# Patient Record
Sex: FEMALE | Race: WHITE | NOT HISPANIC OR LATINO | Employment: OTHER
[De-identification: names, ages, dates, MRNs, and addresses within clinical notes are randomized per-mention and may not be internally consistent; named-entity substitution may affect disease eponyms.]

---

## 2018-04-03 ENCOUNTER — TRANSCRIBE ORDERS (OUTPATIENT)
Dept: SCHEDULING | Age: 63
End: 2018-04-03

## 2018-04-03 DIAGNOSIS — Z12.39 SCREENING BREAST EXAMINATION: Primary | ICD-10-CM

## 2018-04-23 ENCOUNTER — HOSPITAL ENCOUNTER (OUTPATIENT)
Dept: RADIOLOGY | Age: 63
Discharge: HOME | End: 2018-04-23
Attending: INTERNAL MEDICINE
Payer: COMMERCIAL

## 2018-04-23 DIAGNOSIS — Z12.39 SCREENING BREAST EXAMINATION: ICD-10-CM

## 2018-04-23 PROCEDURE — 77063 BREAST TOMOSYNTHESIS BI: CPT

## 2021-04-14 DIAGNOSIS — Z23 ENCOUNTER FOR IMMUNIZATION: ICD-10-CM

## 2023-11-29 ENCOUNTER — APPOINTMENT (OUTPATIENT)
Dept: PREADMISSION TESTING | Facility: HOSPITAL | Age: 68
End: 2023-11-29
Payer: MEDICARE

## 2023-11-29 VITALS — BODY MASS INDEX: 26.68 KG/M2 | HEIGHT: 67 IN | WEIGHT: 170 LBS

## 2023-11-29 RX ORDER — OXYBUTYNIN CHLORIDE 5 MG/1
TABLET ORAL
COMMUNITY
Start: 2023-10-18

## 2023-11-29 RX ORDER — ELETRIPTAN HYDROBROMIDE 40 MG/1
TABLET, FILM COATED ORAL
COMMUNITY
Start: 2023-10-30

## 2023-11-29 NOTE — PRE-PROCEDURE INSTRUCTIONS
1. We will call you between 3 pm and 7 pm on 12/11/23 to inform you of arrival time for your procedure. If you do not hear by 6PM, please call [phone] for arrival time.     2. Please arrive through the Main Entrance of the [facility] (across from the parking garage) and report to the Surgery Registration Desk on the day of your procedure.    3. Please follow the following fasting guidelines:     No solid food EIGHT HOURS prior to arrival time on day of surgery.   clear liquids, meaning water or PLAIN black coffee WITHOUT any milk, cream, sugar, or sweetener are permitted up to TWO HOURS prior to arrival at the hospital.    4. Please take ONLY the following medications with a sip of water on the morning of your procedure: (populate names and/or NONE)    None    5. Other Instructions: You may brush your teeth the morning of the procedure. Rinse and spit, do not swallow.  Bring a list of your medications with dosages.  Use surgical wash as directed.     6. If you develop a cold, cough, fever, rash, or other symptom prior to the day of the procedure, please report it to your physician immediately.    7. If you need to cancel the procedure for any reason, please contact your physician.    8. Make arrangements to have safe transportation home accompanied by a responsible adult. If you have not arranged safe transportation home, your surgery will be cancelled. Safe transportation may include private vehicle, ride-share service, taxi and public transportation when accompanied by a responsible adult who will assist you home. A responsible adult is someone known to you and does not include the taxi, ride-share or public transit drive transporting you.    9.  If it is medically necessary for you to have a longer stay, you will be informed as soon as the decision is made.    10. Only bring essential items to the hospital.  Do not wear or bring anything of value to the hospital including jewelry of any kind, money, or wallet.  Do not wear make-up or contact lenses.  DO NOT BRING MEDICATIONS FROM HOME unless instructed to do so. DO bring your hearing aids, glasses, and a case    11. No lotion, creams, powders, or oils on skin the morning of procedure     12. Dress in comfortable clothes.    13.  If instructed, please bring a copy of your Advanced Directive (Living Will/Durable Power of ) on the day of your procedure.     14. Ensuring your safety at all times is a very important part of our [facility] Culture of Safety. After having surgery and sedation, you are at risk for falling and balance issues. Although you may feel awake, the effects of the medication can last up to 24 hours after anesthesia. If you need to use the bathroom during your recovery period, nursing staff will escort you there and stay with you to ensure your safety.    15. Refrain from drinking alcohol and smoking cigarettes for 24 hours prior to surgery.    16. Shower with antibacterial soap (Dial) the night before and morning of your procedure.  If your procedure indicates the need for CHG antiseptic wash (Bactoshield or Hibiclens), please use this instead and follow instructions as discussed at the time of your Pre-Admission Testing phone interview or visit.    Above instructions reviewed with patient and patient acknowledges understanding.    Form explained by: Ju Wood RN

## 2023-12-07 ENCOUNTER — ANESTHESIA EVENT (OUTPATIENT)
Dept: OPERATING ROOM | Facility: HOSPITAL | Age: 68
Setting detail: HOSPITAL OUTPATIENT SURGERY
End: 2023-12-07
Payer: MEDICARE

## 2023-12-11 RX ORDER — SCOPOLAMINE 1 MG/3D
1 PATCH, EXTENDED RELEASE TRANSDERMAL ONCE
Status: DISCONTINUED | OUTPATIENT
Start: 2023-12-12 | End: 2023-12-13 | Stop reason: HOSPADM

## 2023-12-12 ENCOUNTER — HOSPITAL ENCOUNTER (OUTPATIENT)
Facility: HOSPITAL | Age: 68
Setting detail: HOSPITAL OUTPATIENT SURGERY
Discharge: HOME | End: 2023-12-12
Attending: STUDENT IN AN ORGANIZED HEALTH CARE EDUCATION/TRAINING PROGRAM | Admitting: STUDENT IN AN ORGANIZED HEALTH CARE EDUCATION/TRAINING PROGRAM
Payer: MEDICARE

## 2023-12-12 ENCOUNTER — ANESTHESIA (OUTPATIENT)
Dept: OPERATING ROOM | Facility: HOSPITAL | Age: 68
Setting detail: HOSPITAL OUTPATIENT SURGERY
End: 2023-12-12
Payer: MEDICARE

## 2023-12-12 VITALS
HEART RATE: 88 BPM | OXYGEN SATURATION: 93 % | BODY MASS INDEX: 27.8 KG/M2 | HEIGHT: 66 IN | DIASTOLIC BLOOD PRESSURE: 65 MMHG | RESPIRATION RATE: 18 BRPM | WEIGHT: 173 LBS | TEMPERATURE: 98 F | SYSTOLIC BLOOD PRESSURE: 119 MMHG

## 2023-12-12 DIAGNOSIS — M54.9 DORSALGIA: ICD-10-CM

## 2023-12-12 DIAGNOSIS — M54.2 CERVICALGIA: ICD-10-CM

## 2023-12-12 DIAGNOSIS — N62 MACROMASTIA: ICD-10-CM

## 2023-12-12 PROCEDURE — 71000012 HC PACU PHASE 2 EA ADDL MIN: Performed by: STUDENT IN AN ORGANIZED HEALTH CARE EDUCATION/TRAINING PROGRAM

## 2023-12-12 PROCEDURE — 63600000 HC DRUGS/DETAIL CODE: Mod: JZ | Performed by: STUDENT IN AN ORGANIZED HEALTH CARE EDUCATION/TRAINING PROGRAM

## 2023-12-12 PROCEDURE — 63600000 HC DRUGS/DETAIL CODE: Mod: JZ | Performed by: NURSE ANESTHETIST, CERTIFIED REGISTERED

## 2023-12-12 PROCEDURE — 71000002 HC PACU PHASE 2 INITIAL 30MIN: Performed by: STUDENT IN AN ORGANIZED HEALTH CARE EDUCATION/TRAINING PROGRAM

## 2023-12-12 PROCEDURE — 27200000 HC STERILE SUPPLY: Performed by: STUDENT IN AN ORGANIZED HEALTH CARE EDUCATION/TRAINING PROGRAM

## 2023-12-12 PROCEDURE — 71000011 HC PACU PHASE 1 EA ADDL MIN: Performed by: STUDENT IN AN ORGANIZED HEALTH CARE EDUCATION/TRAINING PROGRAM

## 2023-12-12 PROCEDURE — 0HUV37Z SUPPLEMENT BILATERAL BREAST WITH AUTOLOGOUS TISSUE SUBSTITUTE, PERCUTANEOUS APPROACH: ICD-10-PCS | Performed by: STUDENT IN AN ORGANIZED HEALTH CARE EDUCATION/TRAINING PROGRAM

## 2023-12-12 PROCEDURE — 36000003 HC OR LEVEL 3 INITIAL 30MIN: Performed by: STUDENT IN AN ORGANIZED HEALTH CARE EDUCATION/TRAINING PROGRAM

## 2023-12-12 PROCEDURE — 63700000 HC SELF-ADMINISTRABLE DRUG: Performed by: STUDENT IN AN ORGANIZED HEALTH CARE EDUCATION/TRAINING PROGRAM

## 2023-12-12 PROCEDURE — 25000000 HC PHARMACY GENERAL: Performed by: NURSE ANESTHETIST, CERTIFIED REGISTERED

## 2023-12-12 PROCEDURE — 37000001 HC ANESTHESIA GENERAL: Performed by: STUDENT IN AN ORGANIZED HEALTH CARE EDUCATION/TRAINING PROGRAM

## 2023-12-12 PROCEDURE — 71000001 HC PACU PHASE 1 INITIAL 30MIN: Performed by: STUDENT IN AN ORGANIZED HEALTH CARE EDUCATION/TRAINING PROGRAM

## 2023-12-12 PROCEDURE — 25800000 HC PHARMACY IV SOLUTIONS: Performed by: NURSE ANESTHETIST, CERTIFIED REGISTERED

## 2023-12-12 PROCEDURE — 36000013 HC OR LEVEL 3 EA ADDL MIN: Performed by: STUDENT IN AN ORGANIZED HEALTH CARE EDUCATION/TRAINING PROGRAM

## 2023-12-12 PROCEDURE — 0HBV3ZZ EXCISION OF BILATERAL BREAST, PERCUTANEOUS APPROACH: ICD-10-PCS | Performed by: STUDENT IN AN ORGANIZED HEALTH CARE EDUCATION/TRAINING PROGRAM

## 2023-12-12 PROCEDURE — 63600000 HC DRUGS/DETAIL CODE: Mod: JZ | Performed by: ANESTHESIOLOGY

## 2023-12-12 PROCEDURE — 88305 TISSUE EXAM BY PATHOLOGIST: CPT | Performed by: STUDENT IN AN ORGANIZED HEALTH CARE EDUCATION/TRAINING PROGRAM

## 2023-12-12 PROCEDURE — 63700000 HC SELF-ADMINISTRABLE DRUG: Performed by: ANESTHESIOLOGY

## 2023-12-12 RX ORDER — ACETAMINOPHEN 325 MG/1
975 TABLET ORAL ONCE
Status: COMPLETED | OUTPATIENT
Start: 2023-12-12 | End: 2023-12-12

## 2023-12-12 RX ORDER — SODIUM CHLORIDE, SODIUM LACTATE, POTASSIUM CHLORIDE, CALCIUM CHLORIDE 600; 310; 30; 20 MG/100ML; MG/100ML; MG/100ML; MG/100ML
INJECTION, SOLUTION INTRAVENOUS CONTINUOUS
Status: DISCONTINUED | OUTPATIENT
Start: 2023-12-12 | End: 2023-12-13 | Stop reason: HOSPADM

## 2023-12-12 RX ORDER — HYDROMORPHONE HYDROCHLORIDE 1 MG/ML
INJECTION, SOLUTION INTRAMUSCULAR; INTRAVENOUS; SUBCUTANEOUS AS NEEDED
Status: DISCONTINUED | OUTPATIENT
Start: 2023-12-12 | End: 2023-12-12 | Stop reason: SURG

## 2023-12-12 RX ORDER — ACETAMINOPHEN 325 MG/1
975 TABLET ORAL
Status: DISCONTINUED | OUTPATIENT
Start: 2023-12-12 | End: 2023-12-13 | Stop reason: HOSPADM

## 2023-12-12 RX ORDER — MIDAZOLAM HYDROCHLORIDE 2 MG/2ML
INJECTION, SOLUTION INTRAMUSCULAR; INTRAVENOUS AS NEEDED
Status: DISCONTINUED | OUTPATIENT
Start: 2023-12-12 | End: 2023-12-12 | Stop reason: SURG

## 2023-12-12 RX ORDER — GABAPENTIN 300 MG/1
300 CAPSULE ORAL 3 TIMES DAILY
Status: DISCONTINUED | OUTPATIENT
Start: 2023-12-12 | End: 2023-12-13 | Stop reason: HOSPADM

## 2023-12-12 RX ORDER — IBUPROFEN 200 MG
16-32 TABLET ORAL AS NEEDED
Status: DISCONTINUED | OUTPATIENT
Start: 2023-12-12 | End: 2023-12-13 | Stop reason: HOSPADM

## 2023-12-12 RX ORDER — OXYCODONE HYDROCHLORIDE 5 MG/1
10 TABLET ORAL EVERY 4 HOURS PRN
Status: DISCONTINUED | OUTPATIENT
Start: 2023-12-12 | End: 2023-12-13 | Stop reason: HOSPADM

## 2023-12-12 RX ORDER — PROPOFOL 10 MG/ML
INJECTION, EMULSION INTRAVENOUS AS NEEDED
Status: DISCONTINUED | OUTPATIENT
Start: 2023-12-12 | End: 2023-12-12 | Stop reason: SURG

## 2023-12-12 RX ORDER — ONDANSETRON HYDROCHLORIDE 2 MG/ML
INJECTION, SOLUTION INTRAVENOUS AS NEEDED
Status: DISCONTINUED | OUTPATIENT
Start: 2023-12-12 | End: 2023-12-12 | Stop reason: SURG

## 2023-12-12 RX ORDER — AMOXICILLIN 250 MG
1 CAPSULE ORAL 2 TIMES DAILY
Status: DISCONTINUED | OUTPATIENT
Start: 2023-12-12 | End: 2023-12-13 | Stop reason: HOSPADM

## 2023-12-12 RX ORDER — DIAZEPAM 5 MG/1
5 TABLET ORAL EVERY 8 HOURS PRN
Status: DISCONTINUED | OUTPATIENT
Start: 2023-12-12 | End: 2023-12-13 | Stop reason: HOSPADM

## 2023-12-12 RX ORDER — ONDANSETRON HYDROCHLORIDE 2 MG/ML
4 INJECTION, SOLUTION INTRAVENOUS EVERY 8 HOURS PRN
Status: DISCONTINUED | OUTPATIENT
Start: 2023-12-12 | End: 2023-12-13 | Stop reason: HOSPADM

## 2023-12-12 RX ORDER — HYDROMORPHONE HYDROCHLORIDE 1 MG/ML
0.5 INJECTION, SOLUTION INTRAMUSCULAR; INTRAVENOUS; SUBCUTANEOUS
Status: DISCONTINUED | OUTPATIENT
Start: 2023-12-12 | End: 2023-12-12 | Stop reason: HOSPADM

## 2023-12-12 RX ORDER — LIDOCAINE HYDROCHLORIDE 10 MG/ML
INJECTION, SOLUTION INFILTRATION; PERINEURAL AS NEEDED
Status: DISCONTINUED | OUTPATIENT
Start: 2023-12-12 | End: 2023-12-12 | Stop reason: SURG

## 2023-12-12 RX ORDER — SCOPOLAMINE 1 MG/3D
1 PATCH, EXTENDED RELEASE TRANSDERMAL
Status: DISCONTINUED | OUTPATIENT
Start: 2023-12-12 | End: 2023-12-12 | Stop reason: HOSPADM

## 2023-12-12 RX ORDER — EPHEDRINE SULFATE 50 MG/ML
INJECTION, SOLUTION INTRAVENOUS AS NEEDED
Status: DISCONTINUED | OUTPATIENT
Start: 2023-12-12 | End: 2023-12-12 | Stop reason: SURG

## 2023-12-12 RX ORDER — PHENYLEPHRINE HYDROCHLORIDE 10 MG/ML
INJECTION INTRAVENOUS AS NEEDED
Status: DISCONTINUED | OUTPATIENT
Start: 2023-12-12 | End: 2023-12-12 | Stop reason: SURG

## 2023-12-12 RX ORDER — SODIUM CHLORIDE, SODIUM GLUCONATE, SODIUM ACETATE, POTASSIUM CHLORIDE AND MAGNESIUM CHLORIDE 30; 37; 368; 526; 502 MG/100ML; MG/100ML; MG/100ML; MG/100ML; MG/100ML
INJECTION, SOLUTION INTRAVENOUS CONTINUOUS PRN
Status: DISCONTINUED | OUTPATIENT
Start: 2023-12-12 | End: 2023-12-12 | Stop reason: SURG

## 2023-12-12 RX ORDER — HYDROMORPHONE HYDROCHLORIDE 1 MG/ML
0.5 INJECTION, SOLUTION INTRAMUSCULAR; INTRAVENOUS; SUBCUTANEOUS EVERY 4 HOURS PRN
Status: DISCONTINUED | OUTPATIENT
Start: 2023-12-12 | End: 2023-12-13 | Stop reason: HOSPADM

## 2023-12-12 RX ORDER — DEXTROSE 50 % IN WATER (D50W) INTRAVENOUS SYRINGE
25 AS NEEDED
Status: DISCONTINUED | OUTPATIENT
Start: 2023-12-12 | End: 2023-12-13 | Stop reason: HOSPADM

## 2023-12-12 RX ORDER — GABAPENTIN 300 MG/1
300 CAPSULE ORAL ONCE
Status: COMPLETED | OUTPATIENT
Start: 2023-12-12 | End: 2023-12-12

## 2023-12-12 RX ORDER — DEXAMETHASONE SODIUM PHOSPHATE 4 MG/ML
INJECTION, SOLUTION INTRA-ARTICULAR; INTRALESIONAL; INTRAMUSCULAR; INTRAVENOUS; SOFT TISSUE AS NEEDED
Status: DISCONTINUED | OUTPATIENT
Start: 2023-12-12 | End: 2023-12-12 | Stop reason: SURG

## 2023-12-12 RX ORDER — FENTANYL CITRATE 50 UG/ML
INJECTION, SOLUTION INTRAMUSCULAR; INTRAVENOUS AS NEEDED
Status: DISCONTINUED | OUTPATIENT
Start: 2023-12-12 | End: 2023-12-12 | Stop reason: SURG

## 2023-12-12 RX ORDER — ONDANSETRON HYDROCHLORIDE 2 MG/ML
4 INJECTION, SOLUTION INTRAVENOUS
Status: DISCONTINUED | OUTPATIENT
Start: 2023-12-12 | End: 2023-12-12 | Stop reason: HOSPADM

## 2023-12-12 RX ORDER — FENTANYL CITRATE 50 UG/ML
50 INJECTION, SOLUTION INTRAMUSCULAR; INTRAVENOUS EVERY 5 MIN PRN
Status: DISCONTINUED | OUTPATIENT
Start: 2023-12-12 | End: 2023-12-12 | Stop reason: HOSPADM

## 2023-12-12 RX ORDER — DEXTROSE 40 %
15-30 GEL (GRAM) ORAL AS NEEDED
Status: DISCONTINUED | OUTPATIENT
Start: 2023-12-12 | End: 2023-12-13 | Stop reason: HOSPADM

## 2023-12-12 RX ORDER — HYDRALAZINE HYDROCHLORIDE 20 MG/ML
5 INJECTION INTRAMUSCULAR; INTRAVENOUS 2 TIMES DAILY PRN
Status: DISCONTINUED | OUTPATIENT
Start: 2023-12-12 | End: 2023-12-12 | Stop reason: HOSPADM

## 2023-12-12 RX ORDER — DEXAMETHASONE SODIUM PHOSPHATE 4 MG/ML
10 INJECTION, SOLUTION INTRA-ARTICULAR; INTRALESIONAL; INTRAMUSCULAR; INTRAVENOUS; SOFT TISSUE ONCE
Status: DISCONTINUED | OUTPATIENT
Start: 2023-12-12 | End: 2023-12-12 | Stop reason: HOSPADM

## 2023-12-12 RX ORDER — POLYETHYLENE GLYCOL 3350 17 G/17G
17 POWDER, FOR SOLUTION ORAL DAILY
Status: DISCONTINUED | OUTPATIENT
Start: 2023-12-12 | End: 2023-12-13 | Stop reason: HOSPADM

## 2023-12-12 RX ORDER — ROCURONIUM BROMIDE 10 MG/ML
INJECTION, SOLUTION INTRAVENOUS AS NEEDED
Status: DISCONTINUED | OUTPATIENT
Start: 2023-12-12 | End: 2023-12-12 | Stop reason: SURG

## 2023-12-12 RX ORDER — APREPITANT 40 MG/1
1 CAPSULE ORAL ONCE
COMMUNITY
Start: 2023-12-11

## 2023-12-12 RX ADMIN — MIDAZOLAM 2 MG: 1 INJECTION INTRAMUSCULAR; INTRAVENOUS at 07:33

## 2023-12-12 RX ADMIN — FENTANYL CITRATE 100 MCG: 50 INJECTION, SOLUTION INTRAMUSCULAR; INTRAVENOUS at 07:37

## 2023-12-12 RX ADMIN — PHENYLEPHRINE HYDROCHLORIDE 50 MCG: 10 INJECTION INTRAVENOUS at 11:25

## 2023-12-12 RX ADMIN — TRANEXAMIC ACID 1000 MG: 100 INJECTION, SOLUTION INTRAVENOUS at 09:02

## 2023-12-12 RX ADMIN — ROCURONIUM BROMIDE 20 MG: 10 SOLUTION INTRAVENOUS at 09:19

## 2023-12-12 RX ADMIN — GABAPENTIN 300 MG: 300 CAPSULE ORAL at 16:28

## 2023-12-12 RX ADMIN — SODIUM CHLORIDE, POTASSIUM CHLORIDE, SODIUM LACTATE AND CALCIUM CHLORIDE: 600; 310; 30; 20 INJECTION, SOLUTION INTRAVENOUS at 07:16

## 2023-12-12 RX ADMIN — EPHEDRINE SULFATE 5 MG: 50 INJECTION, SOLUTION INTRAVENOUS at 08:25

## 2023-12-12 RX ADMIN — ACETAMINOPHEN 975 MG: 325 TABLET, FILM COATED ORAL at 15:54

## 2023-12-12 RX ADMIN — PROPOFOL 50 MG: 10 INJECTION, EMULSION INTRAVENOUS at 07:39

## 2023-12-12 RX ADMIN — ACETAMINOPHEN 975 MG: 325 TABLET, FILM COATED ORAL at 07:11

## 2023-12-12 RX ADMIN — FENTANYL CITRATE 25 MCG: 50 INJECTION, SOLUTION INTRAMUSCULAR; INTRAVENOUS at 10:34

## 2023-12-12 RX ADMIN — EPHEDRINE SULFATE 10 MG: 50 INJECTION, SOLUTION INTRAVENOUS at 08:28

## 2023-12-12 RX ADMIN — PHENYLEPHRINE HYDROCHLORIDE 50 MCG: 10 INJECTION INTRAVENOUS at 11:08

## 2023-12-12 RX ADMIN — PROPOFOL 150 MG: 10 INJECTION, EMULSION INTRAVENOUS at 07:37

## 2023-12-12 RX ADMIN — HYDROMORPHONE HYDROCHLORIDE 0.5 MG: 1 INJECTION, SOLUTION INTRAMUSCULAR; INTRAVENOUS; SUBCUTANEOUS at 09:02

## 2023-12-12 RX ADMIN — SCOPALAMINE 1 PATCH: 1 PATCH, EXTENDED RELEASE TRANSDERMAL at 07:15

## 2023-12-12 RX ADMIN — EPHEDRINE SULFATE 5 MG: 50 INJECTION, SOLUTION INTRAVENOUS at 09:30

## 2023-12-12 RX ADMIN — PHENYLEPHRINE HYDROCHLORIDE 50 MCG: 10 INJECTION INTRAVENOUS at 09:55

## 2023-12-12 RX ADMIN — ROCURONIUM BROMIDE 20 MG: 10 SOLUTION INTRAVENOUS at 08:44

## 2023-12-12 RX ADMIN — EPHEDRINE SULFATE 10 MG: 50 INJECTION, SOLUTION INTRAVENOUS at 11:29

## 2023-12-12 RX ADMIN — LIDOCAINE HYDROCHLORIDE 5 ML: 10 INJECTION, SOLUTION INFILTRATION; PERINEURAL at 07:37

## 2023-12-12 RX ADMIN — CEFAZOLIN 2 G: 2 INJECTION, POWDER, FOR SOLUTION INTRAMUSCULAR; INTRAVENOUS at 07:31

## 2023-12-12 RX ADMIN — HYDROMORPHONE HYDROCHLORIDE 0.5 MG: 1 INJECTION, SOLUTION INTRAMUSCULAR; INTRAVENOUS; SUBCUTANEOUS at 08:45

## 2023-12-12 RX ADMIN — PROPOFOL 25 MCG/KG/MIN: 10 INJECTION, EMULSION INTRAVENOUS at 07:42

## 2023-12-12 RX ADMIN — PHENYLEPHRINE HYDROCHLORIDE 100 MCG: 10 INJECTION INTRAVENOUS at 08:31

## 2023-12-12 RX ADMIN — PHENYLEPHRINE HYDROCHLORIDE 50 MCG: 10 INJECTION INTRAVENOUS at 11:01

## 2023-12-12 RX ADMIN — CEFAZOLIN 2 G: 2 INJECTION, POWDER, FOR SOLUTION INTRAMUSCULAR; INTRAVENOUS at 11:22

## 2023-12-12 RX ADMIN — GABAPENTIN 300 MG: 300 CAPSULE ORAL at 07:11

## 2023-12-12 RX ADMIN — ROCURONIUM BROMIDE 50 MG: 10 SOLUTION INTRAVENOUS at 07:37

## 2023-12-12 RX ADMIN — SODIUM CHLORIDE, SODIUM GLUCONATE, SODIUM ACETATE, POTASSIUM CHLORIDE AND MAGNESIUM CHLORIDE: 526; 502; 368; 37; 30 INJECTION, SOLUTION INTRAVENOUS at 11:30

## 2023-12-12 RX ADMIN — PHENYLEPHRINE HYDROCHLORIDE 50 MCG: 10 INJECTION INTRAVENOUS at 09:29

## 2023-12-12 RX ADMIN — SUGAMMADEX 160 MG: 100 INJECTION, SOLUTION INTRAVENOUS at 11:17

## 2023-12-12 RX ADMIN — FENTANYL CITRATE 25 MCG: 50 INJECTION, SOLUTION INTRAMUSCULAR; INTRAVENOUS at 10:23

## 2023-12-12 RX ADMIN — DEXAMETHASONE SODIUM PHOSPHATE 10 MG: 4 INJECTION, SOLUTION INTRA-ARTICULAR; INTRALESIONAL; INTRAMUSCULAR; INTRAVENOUS; SOFT TISSUE at 07:45

## 2023-12-12 RX ADMIN — ONDANSETRON 4 MG: 2 INJECTION INTRAMUSCULAR; INTRAVENOUS at 10:45

## 2023-12-12 RX ADMIN — ROCURONIUM BROMIDE 10 MG: 10 SOLUTION INTRAVENOUS at 09:55

## 2023-12-12 RX ADMIN — HYDRALAZINE HYDROCHLORIDE 5 MG: 20 INJECTION INTRAMUSCULAR; INTRAVENOUS at 12:40

## 2023-12-12 NOTE — OP NOTE
REPORT TYPE: Operative Note     DATE OF OPERATION:      PREOPERATIVE DIAGNOSIS:  Macromastia      POSTOPERATIVE DIAGNOSIS:  Macromastia     PROCEDURE:  Bilateral breast reduction with trunk suction-assisted lipectomy and fat grafting to the breasts    ATTENDING SURGEON:  Mabel Dowd MD     ANESTHESIA:  General.     ESTIMATED BLOOD LOSS:  50 ml    The patient was met in the pre-anesthesia holding area. There she was identified x2. The consent form was reviewed with the patient. She expressed an understanding of the plan at hand, accepted the risks and desired that we proceed. Specifically, the indications, alternatives, risks, benefits, and expected results of the proposed procedure were discussed in detail preoperatively in the office. Preoperative markings were placed.      The patient was taken to the operative theatre and laid in the supine position on the operating room table. Sequential compression devices were applied to the lower extremities and found to be functional prior to the induction of general anesthesia. After the smooth and routine induction of general anesthesia, the chest and upper abdomen was prepped and draped in the standard sterile fashion. Pre-operative antibiotics were administered. A time out was performed; all were found to be in agreement.    The right nipple was traced with a 38mm cookie cutter under moderate tension and then an 8cm superomedial pedicle was marked. The pedicle was then de-epithelialized. Next, the medial and lateral breast pillars were created. An inferior wedge of breast tissue was removed along the IMF. A small area of breast tissue was removed superiorly and minor undermining was performed to allow the pedicle to rotate into that space.  Once removed, the breast specimen was passed off the field. The wound was irrigated and hemostasis revised. The breast pillars were joined with 2-0 PDS.     The identical procedure was then performed on the contralateral  side.    Given the largely fatty nature of the patient's breasts and given the amount of desired reduction, 1 liter of tumescent solution was infused into the breast and axillary region. After allowing adequate time for the tumescent to reach full effect, a 4 mm cannula under 15 mmHg of suction was used to lipoaspirate 200 grams from each breast and axillary region.  In combinationwith 160 grams of paremchyma the right, a total of 360 grams were removed.  On the left 281 grams of parenchyma  plus 200 grams of suction lipectomy for a total of 481 grams were removed.     The patient was sat up once again and breast volume and contour were assessed and found to be symmetrical and appropriate. Nipple position was marked with a 38mm cookie cutter.  The patient was reclined once again.     Both sides were irrigated with warm normal saline solution and hemostasis was meticulously revised. Closure commenced with interrupted inverted 2-0 PDS in the SFS, followed by running 3-0 stratafix in the deep dermis and then subcuticularly. The NAC markings were confirmed and incised/de-epithelialized to allow delivery of the NAC on the pedicle. The NAC was secured to its new position with 3-0 and 4-0 Monocryl.     The patient was placed in the sitting position once more to confirm adequacy of size, shape, and symmetry of the breasts. The fat had been allowed to decant and after being placed in 60 cc syringes, , 200 mL of fat in the right breast and 0 ml of fat in the left breast were injected using a Blankenship aspiration cannula to inject the fat throughout all layers from submuscular to subcutaneous in the superior poles of the breast, being careful to avoid the neurovascular pedicles.  The patient was returned to supine.      The patient was cleansed and then the incisions dressed with xeroform and nitropaste placed on the NACs. She was then dressed with dry gauze and a surgical bra.      She tolerated the procedure well and emerged  from general anesthesia without event. She was transferred to the post-anesthesia recovery unit extubated and in satisfactory condition. All needle and sponge counts were correct x2 at the end of the case.  I was present and scrubbed throughout the entirety of the procedure.   Vicky Dowd MD      Operative time: 3 hours 42 minutes

## 2023-12-12 NOTE — ANESTHESIA PREPROCEDURE EVALUATION
Relevant Problems   No relevant active problems   ponv, motion sickness  Migraines  Motion sickness      ROS/Med Hx     Past Surgical History:   Procedure Laterality Date   • APPENDECTOMY     • WRIST SURGERY Bilateral        Physical Exam    Airway   Mallampati: II  Cardiovascular - normal   Rhythm: regular   Rate: normalPulmonary - normal   clear to auscultation        Anesthesia Plan    Plan: general    Technique: general endotracheal   2 ASA  scop patch ordered

## 2023-12-12 NOTE — DISCHARGE INSTRUCTIONS
Discharge Instructions  for breast reduction or lift Procedure  dr. Mabel cleaning  INSTRUCTIONS GIVEN IN OFFICE ARE MORE UP TO DATE    If you have bandaids over liposuction sites, you may remove them to shower.  After each shower, cover with antibiotic ointment or aquaphor and a bandaid until seen in the office.    Will I be in Pain after the procedure?  You may have some discomfort for the first few days.  It is preferable that you take your prescribed pain medicine before you get too uncomfortable.  Remember that this medicine is strong.  Therefore, you shouldn’t drive while taking it and, to avoid constipation, it is a good idea to eat food with fiber (prunes, raisin bran) or take an over the counter stool softener, such as Colace or Milk of Magnesia.    Two days or 48 hours after your procedure, you may start to take ibuprofen (i.e. Advil or Motrin).   These anti-inflammatory drugs can be very helpful to control your discomfort, but must be avoided for the first two days to avoid increased bleeding or bruising.     What are the other side effects of the Procedure?  Mild fevers are very common for the first 2-3 days after your procedure and are a normal part of your body’s recovery.  Small amounts of blood tinged fluid or yellowish fluid may leak from around the tapes on your skin.  This is also completely normal and should stop within a few days.     You will likely have some bruising and swelling of your breasts.  This is completely normal.  However, if you notice a significant increase in the size of one breast, if the breasts become increasingly more painful, or if the skin looks more red and inflamed, please contact the office.       Signs and symptoms to report to Dr. Cleaning include:  Noticeable abrupt increase in pain or swelling  A temperature above 100oF more than three days after the procedure.  Chills that persist or recur repetitively and are unrelieved with clothing and blankets.  Excessive redness or  significantly worsening swelling along the incisions.  Increasing drainage from the incision line other than the usual clear to red tinged drainage that may occur for the first 1-2 days.    What should I be aware of following the procedure?  Support Bras  Upon awakening from anesthesia, you will be placed in a surgical support bra with gauze under the bra.  This can be removed in 2 days and you may shower.  The gauze can be discarded and is no longer necessary.  After removing the bra to shower, you may either continue to use the surgical bra or switch to a supportive bra of your choice.  A list of suggested bras will be provided but remember that not all bras fit each patient the same.  Any well supported bra is adequate.  You may have to wear a slightly larger bra during the early recovery period while there is still swelling.  Drains  Upon completion of your breast procedure, you may have small drains coming from your incision.  Dr. Dowd uses newer techniques that do not always require the use of drains.  If they are used, however, these drains will be responsible for removing any fluid under your skin over the first few days and will be removed as the amount of drainage decreases.  They are clear thin tubes connected to a clear bulb that is the size of an avocado.  The drains can be pinned to your surgical bra so that you don't have to carry them around.  You will be taught how to empty and record the amount of fluid that comes out of each drain.  This is typically performed twice a day, but must be done frequently enough to prevent the drains from ever becoming completely full.    The color of the fluid within the drains will turn from a reddish cool-aid color to a yellowish brown and may have a white substance in it.  These are normal fluids related to healing.  The spot where the drains exit the skin is typically in the under fold of the breast.  This is usually water tight, but if a small amount of fluid  leaks around the drain where the drain meets the skin, this is completely normal.   You may shower with the drains in place after the surgical bra is removed.  After each shower, you should put antibiotic ointment (Neosporin, Polysporin, etc) at the site where the drains meet the skin.    Bathing and Glue/Tape  You will have pieces of tape or glue over the incisions.  When you shower, water may get on the taped or glued areas.  Pat the incisions dry with a towel.  The pieces of tape or glue will slowly fall off on their own after 1-2 weeks.  If the tape becomes tremendously itchy to the skin, it can be removed.  Stitches  Often, there will be small absorbable stitches along the incisions that your body gets rid of rather than absorbing.  This is completely natural and these stitches can be removed in the office.    WHen Can I resume my normal routine?  After your procedure, it is important to relax and have a peaceful recovery.  It is also very important to do a moderate amount of walking each day (walking up and down stairs or taking a walk around your neighborhood), even the first day after your procedure, to help with the circulation in your legs.  You may perform your usual daily activities immediately after your procedure.  This includes walking, raising your arms over your head, and reaching for objects.  However, you should avoid lifting anything heavier than a gallon of milk and any strenuous exercising for the first one to two weeks after surgery unless Dr. Dowd says otherwise.  The doctor will instruct you when it is okay to return to fitness activities.        If you have any questions, please do not hesitate to call our office [phone].  We are happy to address any questions at any time.

## 2023-12-12 NOTE — ANESTHESIA POSTPROCEDURE EVALUATION
Patient: Petrona Webb    Procedure Summary     Date: 12/12/23 Room / Location:  OR 2 / [facility] OR    Anesthesia Start: 0732 Anesthesia Stop: 1159    Procedure: BILATERAL MAMMOPLASTY REDUCTION, TRUNK SUCTION ASSISTED LIPECTOMY Fat grafting to breasts (Bilateral: Breast) Diagnosis:       Macromastia      Cervicalgia      Dorsalgia      (Macromastia [N62])      (Cervicalgia [M54.2])      (Dorsalgia [M54.9])    Surgeons: Vicky Dowd MD Responsible Provider: Nova Srivastava MD    Anesthesia Type: general ASA Status: 2          Anesthesia Type: general  PACU Vitals  12/12/2023 1151 - 12/12/2023 1249      12/12/2023  1159 12/12/2023  1200 12/12/2023  1215 12/12/2023  1230    BP: 175/84 175/84 180/105 181/99    Pulse: 70 70 74 74    Resp: 13 8 10 10    SpO2: 96 % 96 % 94 % 94 %              12/12/2023  1240 12/12/2023  1245          BP: 181/95 172/92      Pulse: 74 72      Resp: 11 11      SpO2: -- 94 %              Anesthesia Post Evaluation    Pain management: adequate  Patient participation: complete - patient participated  Level of consciousness: awake and alert  Cardiovascular status: acceptable  Airway Patency: adequate  Respiratory status: acceptable  Hydration status: acceptable  Anesthetic complications: no

## 2023-12-12 NOTE — ANESTHESIOLOGIST PRE-PROCEDURE ATTESTATION
Pre-Procedure Patient Identification:  I am the Primary Anesthesiologist and have identified the patient on 12/12/23 at 6:40 AM.   I have confirmed the procedure(s) will be performed by the following surgeon/proceduralist Vicky Dowd MD.

## 2023-12-12 NOTE — OR SURGEON
Pre-Procedure patient identification:  I am the primary operating surgeon/proceduralist and I have reviewed the applicable pathology reports and radiology studies for this procedure. I have identified the patient on 12/12/23 at 7:27 AM [phone]

## 2023-12-12 NOTE — BRIEF OP NOTE
BILATERAL MAMMOPLASTY REDUCTION, TRUNK SUCTION ASSISTED LIPECTOMY Fat grafting to breasts (B) Procedure Note    Procedure:    BILATERAL MAMMOPLASTY REDUCTION, TRUNK SUCTION ASSISTED LIPECTOMY Fat grafting to breasts  CPT(R) Code:  73624 - BREAST REDUCTION      Pre-op Diagnosis     * Macromastia [N62]     * Cervicalgia [M54.2]     * Dorsalgia [M54.9]       Post-op Diagnosis     * Macromastia [N62]     * Cervicalgia [M54.2]     * Dorsalgia [M54.9]    Surgeon(s) and Role:     * Vicky Dowd MD - Primary    Anesthesia: General    Staff:   Circulator: Mey Heredia, RN; Melissa Robin RN  Scrub Person: Marjorie Machuca RN; Debbie Hager RN  Registered Nurse First Assistant: Mey Alegre RN    Procedure Details   Bilateral breast reduction, liposuction of axilla, fat grafting to right breast    Estimated Blood Loss: No blood loss documented.    Specimens:                Order Name Source Comment Collection Info Order Time   PATHOLOGY TISSUE EXAM Breast, Right Pre-op diagnosis:  Macromastia [N62]  Cervicalgia [M54.2]  Dorsalgia [M54.9] Collected By: Vicky Dowd MD 12/12/2023 10:26 AM     Release to patient   Immediate              Drains: * No LDAs found *    Implants: * No implants in log *           Complications:  None; patient tolerated the procedure well.           Disposition: PACU - hemodynamically stable.           Condition: stable    Vicky Dowd MD  Phone Number: [phone]

## 2023-12-12 NOTE — ANESTHESIA PROCEDURE NOTES
Airway  Urgency: elective    Start Time: 12/12/2023 7:38 AM  Airway not difficult    General Information and Staff    Patient location during procedure: OR  Anesthesiologist: Nova Srivastava MD  Resident/CRNA: Astrid Hameed CRNA  Performed: resident/CRNA   Performed by: Astrid Hameed CRNA  Authorized by: Nova Srivastava MD      Indications and Patient Condition  Indications for airway management: anesthesia  Sedation level: general  Preoxygenated: yes  Patient position: sniffing  Mask difficulty assessment: 1 - vent by mask    Final Airway Details  Final airway type: endotracheal airway      Successful airway: ETT  Cuffed: yes   Successful intubation technique: direct laryngoscopy  Endotracheal tube insertion site: oral  Blade: Delilah  Blade size: #3  ETT size (mm): 7.0  Cormack-Lehane Classification: grade I - full view of glottis  Placement verified by: chest auscultation and capnometry   Measured from: lips  ETT to lips (cm): 22  Number of attempts at approach: 1  Atraumatic airway insertion

## 2023-12-14 LAB
CASE RPRT: NORMAL
CLINICAL INFO: NORMAL
PATH REPORT.FINAL DX SPEC: NORMAL
PATH REPORT.GROSS SPEC: NORMAL

## (undated) DEVICE — DRESSING ABD STER LGE 8X10

## (undated) DEVICE — TUBING INFILTRATION STERILE SINGLE SPIKE

## (undated) DEVICE — SUTURE BIOSYN 4-0 UNDYED 1X18 P-12

## (undated) DEVICE — Device

## (undated) DEVICE — LINER CANISTER DISP 2000CC SAL

## (undated) DEVICE — SUTURE BIOSYN 3-0 UNDYED 1X18 P-12

## (undated) DEVICE — SUTURE STRATAFIX PGA 3-0 FS-1 CUTTING 30CM

## (undated) DEVICE — SUTURE MAXON 2-0 GREEN 1X30 GS-21

## (undated) DEVICE — TIP BOVIE BLADE COATED INSULATED 2.75IN

## (undated) DEVICE — TUBING ASPIRATION TAPERED 12FT LONG

## (undated) DEVICE — MANIFOLD SINGLE PORT NEPTUNE

## (undated) DEVICE — PAD GROUND ELECTROSURGICAL W/CORD

## (undated) DEVICE — PACK RFID MAJOR BREAST

## (undated) DEVICE — PROTECTOR HEEL FOAM

## (undated) DEVICE — CAST PADDING 6IN

## (undated) DEVICE — GLOVE SZ 6.5 PROTEXIS PI

## (undated) DEVICE — PENEVAC1 NONSTICK SMOKE EVAC

## (undated) DEVICE — SUPPORT BREAST LG 36-42

## (undated) DEVICE — CANISTER LIPOFILTER 3000 ASPIRATION DUAL CHAMBER STERILE

## (undated) DEVICE — STERISTRIP 1/2INX4IN

## (undated) DEVICE — GLOVE SZ 7 LINER PROTEXIS PI BL

## (undated) DEVICE — SOLN IRRIG STER WATER 1000ML

## (undated) DEVICE — CANISTER LINER/LIDS SAL RED

## (undated) DEVICE — TOWEL SURGICAL W17XL27IN BLUE COTTON STANDARD PREWASHED DELI

## (undated) DEVICE — BLADE SCALPEL #15

## (undated) DEVICE — BLADE SCALPEL #10

## (undated) DEVICE — COVER LIGHTHANDLE (STERILE SINGLE PA

## (undated) DEVICE — BINDER ABDOMINAL MEDIUM 10 HIGH 30-36

## (undated) DEVICE — SOLN IRRIG .9%SOD 1000ML

## (undated) DEVICE — CANISTER IMPLOSION PROFF LIPO 2000CC

## (undated) DEVICE — SLEEVE SCD COMFORT KNEE LENGTH MED

## (undated) DEVICE — ADHESIVE SKIN DERMABOND ADVANCED 0.7ML

## (undated) DEVICE — APPLICATOR CHLORAPREP 26ML ORANGE TINT

## (undated) DEVICE — BLANKET LOWER BODY